# Patient Record
Sex: FEMALE | Race: BLACK OR AFRICAN AMERICAN
[De-identification: names, ages, dates, MRNs, and addresses within clinical notes are randomized per-mention and may not be internally consistent; named-entity substitution may affect disease eponyms.]

---

## 2017-11-14 PROBLEM — Z00.00 ENCOUNTER FOR PREVENTIVE HEALTH EXAMINATION: Status: ACTIVE | Noted: 2017-11-14

## 2021-04-06 ENCOUNTER — APPOINTMENT (OUTPATIENT)
Dept: OBGYN | Facility: CLINIC | Age: 56
End: 2021-04-06
Payer: MEDICAID

## 2021-04-06 VITALS
WEIGHT: 185 LBS | DIASTOLIC BLOOD PRESSURE: 94 MMHG | HEIGHT: 67 IN | BODY MASS INDEX: 29.03 KG/M2 | HEART RATE: 62 BPM | TEMPERATURE: 97.8 F | SYSTOLIC BLOOD PRESSURE: 139 MMHG

## 2021-04-06 DIAGNOSIS — Z63.5 DISRUPTION OF FAMILY BY SEPARATION AND DIVORCE: ICD-10-CM

## 2021-04-06 DIAGNOSIS — Z78.9 OTHER SPECIFIED HEALTH STATUS: ICD-10-CM

## 2021-04-06 DIAGNOSIS — Z85.831 PERSONAL HISTORY OF MALIGNANT NEOPLASM OF SOFT TISSUE: ICD-10-CM

## 2021-04-06 DIAGNOSIS — Z01.419 ENCOUNTER FOR GYNECOLOGICAL EXAMINATION (GENERAL) (ROUTINE) W/OUT ABNORMAL FINDINGS: ICD-10-CM

## 2021-04-06 DIAGNOSIS — Z83.3 FAMILY HISTORY OF DIABETES MELLITUS: ICD-10-CM

## 2021-04-06 DIAGNOSIS — Z82.49 FAMILY HISTORY OF ISCHEMIC HEART DISEASE AND OTHER DISEASES OF THE CIRCULATORY SYSTEM: ICD-10-CM

## 2021-04-06 DIAGNOSIS — Z80.3 FAMILY HISTORY OF MALIGNANT NEOPLASM OF BREAST: ICD-10-CM

## 2021-04-06 LAB
BILIRUB UR QL STRIP: NORMAL
CLARITY UR: CLEAR
COLLECTION METHOD: NORMAL
GLUCOSE UR-MCNC: NORMAL
HCG UR QL: 0.2 EU/DL
HGB UR QL STRIP.AUTO: NORMAL
KETONES UR-MCNC: ABNORMAL
LEUKOCYTE ESTERASE UR QL STRIP: NORMAL
NITRITE UR QL STRIP: NORMAL
PH UR STRIP: 5.5
PROT UR STRIP-MCNC: NORMAL
SP GR UR STRIP: 1.02

## 2021-04-06 PROCEDURE — 99072 ADDL SUPL MATRL&STAF TM PHE: CPT

## 2021-04-06 PROCEDURE — 99396 PREV VISIT EST AGE 40-64: CPT

## 2021-04-06 PROCEDURE — 81003 URINALYSIS AUTO W/O SCOPE: CPT | Mod: QW

## 2021-04-06 SDOH — SOCIAL STABILITY - SOCIAL INSECURITY: DISRUPTION OF FAMILY BY SEPARATION AND DIVORCE: Z63.5

## 2021-04-09 LAB
C TRACH RRNA SPEC QL NAA+PROBE: NOT DETECTED
HPV HIGH+LOW RISK DNA PNL CVX: NOT DETECTED
N GONORRHOEA RRNA SPEC QL NAA+PROBE: NOT DETECTED
SOURCE AMPLIFICATION: NORMAL
SOURCE AMPLIFICATION: NORMAL
T VAGINALIS RRNA SPEC QL NAA+PROBE: NOT DETECTED

## 2021-04-15 LAB — CYTOLOGY CVX/VAG DOC THIN PREP: ABNORMAL

## 2021-04-19 RX ORDER — METRONIDAZOLE 7.5 MG/G
0.75 GEL VAGINAL
Qty: 1 | Refills: 0 | Status: ACTIVE | COMMUNITY
Start: 2021-04-19 | End: 1900-01-01

## 2021-04-20 ENCOUNTER — NON-APPOINTMENT (OUTPATIENT)
Age: 56
End: 2021-04-20

## 2021-05-18 PROBLEM — Z85.831: Status: RESOLVED | Noted: 2021-05-18 | Resolved: 2021-05-18

## 2021-05-18 PROBLEM — Z78.9 EXERCISES OCCASIONALLY: Status: ACTIVE | Noted: 2021-05-18

## 2021-05-18 PROBLEM — Z01.419 ENCOUNTER FOR ANNUAL ROUTINE GYNECOLOGICAL EXAMINATION: Status: RESOLVED | Noted: 2021-05-18 | Resolved: 2021-06-01

## 2021-05-18 PROBLEM — Z63.5 DIVORCED: Status: ACTIVE | Noted: 2021-05-18

## 2021-05-18 PROBLEM — Z83.3 FAMILY HISTORY OF DIABETES MELLITUS: Status: ACTIVE | Noted: 2021-05-18

## 2021-05-18 PROBLEM — Z80.3 FAMILY HISTORY OF MALIGNANT NEOPLASM OF BREAST: Status: ACTIVE | Noted: 2021-05-18

## 2021-05-18 PROBLEM — Z82.49 FAMILY HISTORY OF HYPERTENSION: Status: ACTIVE | Noted: 2021-05-18

## 2021-05-18 PROBLEM — Z78.9 CONSUMES ALCOHOL OCCASIONALLY: Status: ACTIVE | Noted: 2021-05-18

## 2021-05-18 PROBLEM — Z78.9 DOES NOT USE TOBACCO: Status: ACTIVE | Noted: 2021-05-18

## 2021-05-18 PROBLEM — Z78.9 DOES NOT USE ILLICIT DRUGS: Status: ACTIVE | Noted: 2021-05-18

## 2021-05-18 NOTE — DISCUSSION/SUMMARY
[FreeTextEntry1] : A: 57yo for annual GYN exam, menopause, elevated BP, BMI 28\par \par P: GYN Exam done\par     pap smear done\par      safe sex practices if active\par      pain and bleeding precautions\par      referral for mammogram\par      encourage to keep GI appt for follow up\par      monitor BP and f/u with PCP for HTN mgmt\par      encourage healthy diet and exercise\par      f/u 1 yr or PRN

## 2021-05-18 NOTE — HISTORY OF PRESENT ILLNESS
[Patient reported mammogram was normal] : Patient reported mammogram was normal [Patient reported PAP Smear was normal] : Patient reported PAP Smear was normal [LMP unknown] : LMP unknown [postmenopausal] : postmenopausal [N] : Patient is not sexually active [Y] : Positive pregnancy history [Menarche Age: ____] : age at menarche was [unfilled] [Currently In Menopause] : currently in menopause [Menopause Age: ____] : age at menopause was [unfilled] [Previously active] : previously active [Gonorrhea test offered] : Gonorrhea test offered [Chlamydia test offered] : Chlamydia test offered [Trichomonas test offered] : Trichomonas test offered [HPV test offered] : HPV test offered [Men] : men [No] : No [FreeTextEntry1] : Pt is here for her annual exam. overall pt feels good, denies any pelvic pain or abnormal bleeding.  [TextBox_4] : 55yo  in menopause came for annual GYN exam and pap smear.  She denies PMB, pelvic pain, discharge, dysuria or other GYN complaints.  She denies h/o abnl pap/HPV, STDs, fibroids of cysts.  She is not currently active.\par \par Patient has FHx of breast cancer in sister and cousins at an early age.  Patient counseled and offered hereditary cancer gene testing and declined today but states will think about it [Mammogramdate] : 2020 [TextBox_19] : will give referral [PapSmeardate] : 2019 [ColonoscopyDate] : 2019 [TextBox_43] : 2 year follow up, has appt this week [PGxTotal] : 6 [[facility]] : 3 [PGHxPremature] : 0 [PGHxAbortions] : 3 [[facility]] : 3 [PGHxABInduced] : 2 [PGHxABSpont] : 1 [PGHxEctopic] : 0 [PGHxMultBirths] : 0

## 2021-05-18 NOTE — PHYSICAL EXAM
[Appropriately responsive] : appropriately responsive [Alert] : alert [No Acute Distress] : no acute distress [No Lymphadenopathy] : no lymphadenopathy [Regular Rate Rhythm] : regular rate rhythm [Soft] : soft [Non-tender] : non-tender [Non-distended] : non-distended [No Mass] : no mass [Oriented x3] : oriented x3 [Examination Of The Breasts] : a normal appearance [Normal] : normal [No Discharge] : no discharge [No Masses] : no breast masses were palpable [Vulvar Atrophy] : vulvar atrophy [No Lesions] : no lesions  [Labia Majora] : normal [Labia Minora] : normal [Atrophy] : atrophy [No Bleeding] : There was no active vaginal bleeding [Tenderness] : nontender [Enlarged ___ wks] : not enlarged [Mass ___ cm] : no uterine mass was palpated [Uterine Adnexae] : normal [FreeTextEntry5] : pap smear done [FreeTextEntry6] : limited due to habitus

## 2022-06-07 ENCOUNTER — APPOINTMENT (OUTPATIENT)
Dept: OBGYN | Facility: CLINIC | Age: 57
End: 2022-06-07

## 2022-06-07 ENCOUNTER — LABORATORY RESULT (OUTPATIENT)
Age: 57
End: 2022-06-07

## 2022-06-07 VITALS
SYSTOLIC BLOOD PRESSURE: 136 MMHG | DIASTOLIC BLOOD PRESSURE: 109 MMHG | WEIGHT: 191 LBS | HEART RATE: 94 BPM | HEIGHT: 67 IN | BODY MASS INDEX: 29.98 KG/M2 | TEMPERATURE: 98.4 F

## 2022-06-07 DIAGNOSIS — L29.2 PRURITUS VULVAE: ICD-10-CM

## 2022-06-07 DIAGNOSIS — B96.89 ACUTE VAGINITIS: ICD-10-CM

## 2022-06-07 DIAGNOSIS — N76.0 ACUTE VAGINITIS: ICD-10-CM

## 2022-06-07 DIAGNOSIS — I10 ESSENTIAL (PRIMARY) HYPERTENSION: ICD-10-CM

## 2022-06-07 PROCEDURE — 99396 PREV VISIT EST AGE 40-64: CPT

## 2022-06-07 PROCEDURE — 81003 URINALYSIS AUTO W/O SCOPE: CPT | Mod: QW

## 2022-06-08 LAB
BILIRUB UR QL STRIP: NORMAL
CLARITY UR: CLEAR
COLLECTION METHOD: NORMAL
GLUCOSE UR-MCNC: NORMAL
HCG UR QL: 0.2 EU/DL
HGB UR QL STRIP.AUTO: NORMAL
KETONES UR-MCNC: NORMAL
LEUKOCYTE ESTERASE UR QL STRIP: NORMAL
NITRITE UR QL STRIP: NORMAL
PH UR STRIP: 5.5
PROT UR STRIP-MCNC: NORMAL
SP GR UR STRIP: 1.02

## 2022-06-14 ENCOUNTER — EMERGENCY (EMERGENCY)
Facility: HOSPITAL | Age: 57
LOS: 0 days | Discharge: HOME | End: 2022-06-14
Attending: EMERGENCY MEDICINE | Admitting: EMERGENCY MEDICINE
Payer: MEDICAID

## 2022-06-14 VITALS
WEIGHT: 195.11 LBS | SYSTOLIC BLOOD PRESSURE: 142 MMHG | RESPIRATION RATE: 20 BRPM | TEMPERATURE: 99 F | DIASTOLIC BLOOD PRESSURE: 88 MMHG | OXYGEN SATURATION: 99 % | HEART RATE: 62 BPM | HEIGHT: 67 IN

## 2022-06-14 DIAGNOSIS — Z98.84 BARIATRIC SURGERY STATUS: Chronic | ICD-10-CM

## 2022-06-14 DIAGNOSIS — I10 ESSENTIAL (PRIMARY) HYPERTENSION: ICD-10-CM

## 2022-06-14 DIAGNOSIS — J45.909 UNSPECIFIED ASTHMA, UNCOMPLICATED: ICD-10-CM

## 2022-06-14 DIAGNOSIS — M79.89 OTHER SPECIFIED SOFT TISSUE DISORDERS: ICD-10-CM

## 2022-06-14 DIAGNOSIS — R06.02 SHORTNESS OF BREATH: ICD-10-CM

## 2022-06-14 DIAGNOSIS — Z98.89 OTHER SPECIFIED POSTPROCEDURAL STATES: Chronic | ICD-10-CM

## 2022-06-14 LAB
ALBUMIN SERPL ELPH-MCNC: 4.5 G/DL — SIGNIFICANT CHANGE UP (ref 3.5–5.2)
ALP SERPL-CCNC: 95 U/L — SIGNIFICANT CHANGE UP (ref 30–115)
ALT FLD-CCNC: 16 U/L — SIGNIFICANT CHANGE UP (ref 0–41)
ANION GAP SERPL CALC-SCNC: 11 MMOL/L — SIGNIFICANT CHANGE UP (ref 7–14)
AST SERPL-CCNC: 29 U/L — SIGNIFICANT CHANGE UP (ref 0–41)
BASOPHILS # BLD AUTO: 0.03 K/UL — SIGNIFICANT CHANGE UP (ref 0–0.2)
BASOPHILS NFR BLD AUTO: 0.7 % — SIGNIFICANT CHANGE UP (ref 0–1)
BILIRUB SERPL-MCNC: 0.4 MG/DL — SIGNIFICANT CHANGE UP (ref 0.2–1.2)
BUN SERPL-MCNC: 16 MG/DL — SIGNIFICANT CHANGE UP (ref 10–20)
CALCIUM SERPL-MCNC: 9.2 MG/DL — SIGNIFICANT CHANGE UP (ref 8.5–10.1)
CHLORIDE SERPL-SCNC: 102 MMOL/L — SIGNIFICANT CHANGE UP (ref 98–110)
CO2 SERPL-SCNC: 27 MMOL/L — SIGNIFICANT CHANGE UP (ref 17–32)
CREAT SERPL-MCNC: 1 MG/DL — SIGNIFICANT CHANGE UP (ref 0.7–1.5)
D DIMER BLD IA.RAPID-MCNC: 152 NG/ML DDU — SIGNIFICANT CHANGE UP (ref 0–230)
EGFR: 66 ML/MIN/1.73M2 — SIGNIFICANT CHANGE UP
EOSINOPHIL # BLD AUTO: 0.15 K/UL — SIGNIFICANT CHANGE UP (ref 0–0.7)
EOSINOPHIL NFR BLD AUTO: 3.7 % — SIGNIFICANT CHANGE UP (ref 0–8)
GLUCOSE SERPL-MCNC: 116 MG/DL — HIGH (ref 70–99)
HCT VFR BLD CALC: 35.3 % — LOW (ref 37–47)
HGB BLD-MCNC: 11.4 G/DL — LOW (ref 12–16)
IMM GRANULOCYTES NFR BLD AUTO: 0.2 % — SIGNIFICANT CHANGE UP (ref 0.1–0.3)
LYMPHOCYTES # BLD AUTO: 1.05 K/UL — LOW (ref 1.2–3.4)
LYMPHOCYTES # BLD AUTO: 25.9 % — SIGNIFICANT CHANGE UP (ref 20.5–51.1)
MAGNESIUM SERPL-MCNC: 2.5 MG/DL — HIGH (ref 1.8–2.4)
MCHC RBC-ENTMCNC: 28.4 PG — SIGNIFICANT CHANGE UP (ref 27–31)
MCHC RBC-ENTMCNC: 32.3 G/DL — SIGNIFICANT CHANGE UP (ref 32–37)
MCV RBC AUTO: 88 FL — SIGNIFICANT CHANGE UP (ref 81–99)
MONOCYTES # BLD AUTO: 0.43 K/UL — SIGNIFICANT CHANGE UP (ref 0.1–0.6)
MONOCYTES NFR BLD AUTO: 10.6 % — HIGH (ref 1.7–9.3)
NEUTROPHILS # BLD AUTO: 2.38 K/UL — SIGNIFICANT CHANGE UP (ref 1.4–6.5)
NEUTROPHILS NFR BLD AUTO: 58.9 % — SIGNIFICANT CHANGE UP (ref 42.2–75.2)
NRBC # BLD: 0 /100 WBCS — SIGNIFICANT CHANGE UP (ref 0–0)
NT-PROBNP SERPL-SCNC: 184 PG/ML — SIGNIFICANT CHANGE UP (ref 0–300)
PLATELET # BLD AUTO: 294 K/UL — SIGNIFICANT CHANGE UP (ref 130–400)
POTASSIUM SERPL-MCNC: 3.9 MMOL/L — SIGNIFICANT CHANGE UP (ref 3.5–5)
POTASSIUM SERPL-SCNC: 3.9 MMOL/L — SIGNIFICANT CHANGE UP (ref 3.5–5)
PROT SERPL-MCNC: 7.2 G/DL — SIGNIFICANT CHANGE UP (ref 6–8)
RBC # BLD: 4.01 M/UL — LOW (ref 4.2–5.4)
RBC # FLD: 14.6 % — HIGH (ref 11.5–14.5)
SODIUM SERPL-SCNC: 140 MMOL/L — SIGNIFICANT CHANGE UP (ref 135–146)
TROPONIN T SERPL-MCNC: <0.01 NG/ML — SIGNIFICANT CHANGE UP
WBC # BLD: 4.05 K/UL — LOW (ref 4.8–10.8)
WBC # FLD AUTO: 4.05 K/UL — LOW (ref 4.8–10.8)

## 2022-06-14 PROCEDURE — 93010 ELECTROCARDIOGRAM REPORT: CPT

## 2022-06-14 PROCEDURE — 71045 X-RAY EXAM CHEST 1 VIEW: CPT | Mod: 26

## 2022-06-14 PROCEDURE — 99285 EMERGENCY DEPT VISIT HI MDM: CPT

## 2022-06-14 NOTE — ED PROVIDER NOTE - NSICDXPASTSURGICALHX_GEN_ALL_CORE_FT
PAST SURGICAL HISTORY:  History of gastric bypass 2011    History of recent maxillofacial surgery 2006 with upper oral prosthesis

## 2022-06-14 NOTE — ED PROVIDER NOTE - NS ED ATTENDING STATEMENT MOD
This was a shared visit with the PRABHJOT. I reviewed and verified the documentation and independently performed the documented:

## 2022-06-14 NOTE — ED PROVIDER NOTE - CHIEF COMPLAINT
Patient Information     Patient Name MRN Pennie Car [id number] Female 1951      Telephone Encounter by Jocelin Coe at 2017  9:20 AM     Author:  Jocelin Coe Service:  (none) Author Type:  (none)     Filed:  2017  9:21 AM Encounter Date:  2017 Status:  Signed     :  Jocelin Coe            Spoke with [employer] and let them know patient has future appointments to help diagnose cause of patient's incontinence and we will send back form when we have diagnosis to attach to paperwork.  Jocelin Coe LPN...................2017  9:21 AM         Refill approved for 1 month.  Alexa Damon needs to be seen for an appointment before further refills are given.     The patient is a 57y Female complaining of shortness of breath.

## 2022-06-14 NOTE — ED PROVIDER NOTE - CLINICAL SUMMARY MEDICAL DECISION MAKING FREE TEXT BOX
57-year-old female history of recent ?  coil presenting for evaluation of intermittent shortness of breath with associated bilateral lower extremity swelling.  States she had another episode today, worse than prior.  No chest pain.  No other acute complaints.  Well appearing, NAD, non toxic. NCAT PERRLA EOMI neck supple non tender normal wob cta bl rrr abdomen s nt nd no rebound no guarding WWPx4 neuro non focal mild bilateral lower extremity ankle edema, nontender.  Labs EKG imaging reviewed.  Patient with reliable outpatient follow-up, states that she has an appointment with cardiologist to have a Holter monitor placed.  Aware of all results, given a copy of all available results, comfortable with discharge and follow-up outpatient, strict return precautions given. Endorses understanding and aware they can return at any time for further evaluation. No further questions or concerns at this time.

## 2022-06-14 NOTE — ED PROVIDER NOTE - PATIENT PORTAL LINK FT
You can access the FollowMyHealth Patient Portal offered by [facility] by registering at the following website: [email or web address]. By joining [employer]’s FollowMyHealth portal, you will also be able to view your health information using other applications (apps) compatible with our system.

## 2022-06-14 NOTE — ED PROVIDER NOTE - OBJECTIVE STATEMENT
56 yo female, pmh of htn, asthma, recently (3 weeks ago) had procedure done for ? brain aneurysm s/o right femoral MYNXGRIP vascular closing device. Pt reports no issues since closure device placed; states today was driving, felt sob without any specific pain or radiation. Denies fever, chills, cp, abd pain, nvd, syncope, cough, dizziness.

## 2022-06-14 NOTE — ED PROVIDER NOTE - PHYSICAL EXAMINATION
Physical Exam    Vital Signs: I have reviewed the initial vital signs.  Constitutional: appears stated age, no acute distress  Eyes: Conjunctiva pink, Sclera clear  Cardiovascular: S1 and S2, regular rate, regular rhythm, well-perfused extremities, radial pulses equal and 2+, pedal pulses 2+ and equal  Respiratory: unlabored respiratory effort, clear to auscultation bilaterally no wheezing, rales and rhonchi  Gastrointestinal: soft, non-tender abdomen, no pulsatile mass, normal bowl sounds  Musculoskeletal: supple neck, no lower extremity edema, no midline tenderness  Integumentary: warm, dry, no rash  Neurologic: awake, alert, nvi

## 2022-06-14 NOTE — ED PROVIDER NOTE - IV ALTEPLASE EXCL ABS HIDDEN
Discharge instructions given and reviewed with pt. Questions answered at this time. Pt verbalized understanding. Vss. Will call when ready to leave.   
show

## 2022-06-15 LAB
C TRACH RRNA SPEC QL NAA+PROBE: NOT DETECTED
CYTOLOGY CVX/VAG DOC THIN PREP: NORMAL
HPV HIGH+LOW RISK DNA PNL CVX: DETECTED
N GONORRHOEA RRNA SPEC QL NAA+PROBE: NOT DETECTED
SOURCE AMPLIFICATION: NORMAL
SOURCE AMPLIFICATION: NORMAL
T VAGINALIS RRNA SPEC QL NAA+PROBE: NOT DETECTED

## 2022-06-22 ENCOUNTER — APPOINTMENT (OUTPATIENT)
Dept: OBGYN | Facility: CLINIC | Age: 57
End: 2022-06-22

## 2022-06-22 PROCEDURE — 99442: CPT

## 2022-07-31 PROBLEM — N76.0 BACTERIAL VAGINOSIS: Status: RESOLVED | Noted: 2021-04-19 | Resolved: 2022-07-31

## 2022-07-31 PROBLEM — L29.2 VULVAR PRURITUS: Status: ACTIVE | Noted: 2022-07-31

## 2022-07-31 PROBLEM — I10 HTN (HYPERTENSION): Status: ACTIVE | Noted: 2021-05-18

## 2022-07-31 NOTE — PHYSICAL EXAM
[Appropriately responsive] : appropriately responsive [Alert] : alert [No Acute Distress] : no acute distress [No Lymphadenopathy] : no lymphadenopathy [Regular Rate Rhythm] : regular rate rhythm [Soft] : soft [Non-tender] : non-tender [Non-distended] : non-distended [No Mass] : no mass [Examination Of The Breasts] : a normal appearance [No Discharge] : no discharge [No Masses] : no breast masses were palpable [Vulvar Atrophy] : vulvar atrophy [No Lesions] : no lesions  [Labia Majora] : normal [Labia Minora] : normal [Normal] : normal [Atrophy] : atrophy [No Bleeding] : There was no active vaginal bleeding [Tenderness] : nontender [Enlarged ___ wks] : not enlarged [Mass ___ cm] : no uterine mass was palpated [Uterine Adnexae] : normal [FreeTextEntry5] : Pap smear done [FreeTextEntry6] : Exam limited to habitus-no masses or tenderness palpated on bimanual exam

## 2022-07-31 NOTE — DISCUSSION/SUMMARY
[FreeTextEntry1] : A: 57-year-old for annual GYN exam, menopause, vulvar pruritus, ovarian cysts, BMI 29\par \par P: GYN exam done\par Pap smear done\par Safe sex practices if active\par Pain and bleeding precautions\par Clotrimazole/betamethasone Rx given for as needed use\par Referral for pelvic ultrasound given\par Referral for mammogram given\par Encourage healthy diet and exercise\par Follow-up after imaging or as needed

## 2022-07-31 NOTE — HISTORY OF PRESENT ILLNESS
[Patient reported mammogram was normal] : Patient reported mammogram was normal [LMP unknown] : LMP unknown [Patient reported colonoscopy was normal] : Patient reported colonoscopy was normal [postmenopausal] : postmenopausal [N] : Patient is not sexually active [Y] : Positive pregnancy history [unknown] : Patient is unsure of the date of her LMP [Menarche Age: ____] : age at menarche was [unfilled] [Currently In Menopause] : currently in menopause [Menopause Age: ____] : age at menopause was [unfilled] [Previously active] : previously active [Patient reported PAP Smear was normal] : Patient reported PAP Smear was normal [Gonorrhea test offered] : Gonorrhea test offered [Chlamydia test offered] : Chlamydia test offered [Trichomonas test offered] : Trichomonas test offered [HPV test offered] : HPV test offered [Men] : men [No] : No [TextBox_4] : 57-year-old para 3-0-3-3 in menopause came for annual GYN exam and Pap smear.  Patient denies postmenopausal bleeding, pelvic pain, discharge or dysuria.  Patient does state that she had a CT of abdomen and pelvis done September 2021 and was told to follow-up with her GYN and only has followed up now.  Patient brought report that shows mildly heterogenous uterus.  Left ovarian simple cyst measuring 2.1 cm.  Normal right ovary and normal cervix.  There is no lymphadenopathy noted and there was trace fluid in the cul-de-sac.  There are other small findings that are not GYN related that she followed up with her doctor.  Recommendation was for 6-month follow-up pelvic ultrasound to CT to evaluate stability or resolution of cyst.  Patient also complains of intermittent vulvar pruritus.  Patient denies history of abnormal Pap, HPV, STDs or fibroids.  She is not currently sexually active.\par \par U dip: Negative [Mammogramdate] : 2021 [TextBox_19] : Will give referral [PapSmeardate] : 2021 [ColonoscopyDate] : 2022 [TextBox_43] : f/u 2 yrs  [PGxTotal] : 6 [[facility] Center[facility]Term] : 3 [PGHxAbortions] : 3 [[facility] Centeriving] : 3 [PGHxABInduced] : 2 [PGHxABSpont] : 1 [FreeTextEntry1] :

## 2022-08-17 ENCOUNTER — APPOINTMENT (OUTPATIENT)
Dept: OBGYN | Facility: CLINIC | Age: 57
End: 2022-08-17

## 2022-08-17 PROCEDURE — 99442: CPT

## 2022-09-20 ENCOUNTER — APPOINTMENT (OUTPATIENT)
Dept: OBGYN | Facility: CLINIC | Age: 57
End: 2022-09-20

## 2022-09-20 VITALS
TEMPERATURE: 98 F | HEIGHT: 67 IN | BODY MASS INDEX: 29.82 KG/M2 | SYSTOLIC BLOOD PRESSURE: 102 MMHG | HEART RATE: 63 BPM | WEIGHT: 190 LBS | DIASTOLIC BLOOD PRESSURE: 69 MMHG

## 2022-09-20 DIAGNOSIS — Z11.3 ENCOUNTER FOR SCREENING FOR INFECTIONS WITH A PREDOMINANTLY SEXUAL MODE OF TRANSMISSION: ICD-10-CM

## 2022-09-20 DIAGNOSIS — N89.8 OTHER SPECIFIED NONINFLAMMATORY DISORDERS OF VAGINA: ICD-10-CM

## 2022-09-20 PROCEDURE — 99213 OFFICE O/P EST LOW 20 MIN: CPT

## 2022-09-23 ENCOUNTER — NON-APPOINTMENT (OUTPATIENT)
Age: 57
End: 2022-09-23

## 2022-09-23 LAB
A VAGINAE DNA VAG QL NAA+PROBE: ABNORMAL
BVAB2 DNA VAG QL NAA+PROBE: ABNORMAL
C KRUSEI DNA VAG QL NAA+PROBE: NEGATIVE
C TRACH RRNA SPEC QL NAA+PROBE: NEGATIVE
MEGA1 DNA VAG QL NAA+PROBE: ABNORMAL
N GONORRHOEA RRNA SPEC QL NAA+PROBE: NEGATIVE
T VAGINALIS RRNA SPEC QL NAA+PROBE: NEGATIVE

## 2022-09-23 RX ORDER — METRONIDAZOLE 7.5 MG/G
0.75 GEL VAGINAL
Qty: 1 | Refills: 1 | Status: ACTIVE | COMMUNITY
Start: 2022-09-23 | End: 1900-01-01

## 2022-10-12 PROBLEM — N89.8 VAGINAL IRRITATION: Status: ACTIVE | Noted: 2022-10-12

## 2022-10-12 NOTE — DISCUSSION/SUMMARY
[FreeTextEntry1] : A: 57-year-old for GYN exam, vaginal irritation, vulvovaginal atrophy, ovarian cyst, BMI 29\par \par P: GYN exam done\par Genital cultures done\par Safe sex practices\par Pain and bleeding precautions\par Patient encouraged to get previously recommended pelvic MRI to evaluate complex cyst\par Encourage healthy diet and exercise\par Follow-up after imaging or as needed

## 2022-10-12 NOTE — HISTORY OF PRESENT ILLNESS
[unknown] : Patient is unsure of the date of her LMP [Menarche Age: ____] : age at menarche was [unfilled] [Currently In Menopause] : currently in menopause [Menopause Age: ____] : age at menopause was [unfilled] [Currently Active] : currently active [Men] : men [No] : No [Gonorrhea test offered] : Gonorrhea test offered [Chlamydia test offered] : Chlamydia test offered [Trichomonas test offered] : Trichomonas test offered [postmenopausal] : postmenopausal [N] : Patient does not use contraception [Y] : Positive pregnancy history [Yes] : Yes [FreeTextEntry1] : 58y/o  menopause at age 50, Pt is here for her follow-up exam due to vaginal discomfort.  [TextBox_4] : 57-year-old para 3-0-3-3 in menopause came for GYN evaluation due to having unprotected intercourse and feeling vaginal discomfort and irritation.  She also had noticed some odor and discharge but states the symptoms have improved.  She would like genital cultures done but declines serum STDs.  This is a new partner.  She also previously was found to have an adnexal complex cyst by ultrasound that was followed from her previous CT and an MRI was recommended to look for features of the cysts.  Patient has not went for MRI as of yet and was encouraged to do so as soon as possible.  She denies any pelvic or adnexal pain at this time.  She denies any postmenopausal bleeding or dysuria. [PGxTotal] : 6 [[facility] CenterlTerm] : 3 [PGHxPremature] : 0 [PGHxAbortions] : 3 [[facility] Hospitaliving] : 3

## 2022-10-12 NOTE — PHYSICAL EXAM
[Appropriately responsive] : appropriately responsive [Alert] : alert [No Acute Distress] : no acute distress [Regular Rate Rhythm] : regular rate rhythm [Soft] : soft [Non-tender] : non-tender [Non-distended] : non-distended [No Mass] : no mass [Oriented x3] : oriented x3 [Vulvar Atrophy] : vulvar atrophy [No Lesions] : no lesions  [Labia Majora] : normal [Labia Minora] : normal [No Bleeding] : There was no active vaginal bleeding [Normal] : normal [Tenderness] : nontender [Enlarged ___ wks] : not enlarged [Mass ___ cm] : no uterine mass was palpated [Uterine Adnexae] : normal [FreeTextEntry4] : Scant discharge noted-genital cultures done [FreeTextEntry6] : Exam limited to habitus

## 2022-10-26 ENCOUNTER — APPOINTMENT (OUTPATIENT)
Dept: OBGYN | Facility: CLINIC | Age: 57
End: 2022-10-26

## 2022-10-26 PROCEDURE — 99442: CPT

## 2022-11-07 ENCOUNTER — RX RENEWAL (OUTPATIENT)
Age: 57
End: 2022-11-07

## 2022-12-20 ENCOUNTER — APPOINTMENT (OUTPATIENT)
Dept: OBGYN | Facility: CLINIC | Age: 57
End: 2022-12-20
Payer: MEDICAID

## 2022-12-20 VITALS
WEIGHT: 190 LBS | TEMPERATURE: 98 F | BODY MASS INDEX: 29.82 KG/M2 | HEIGHT: 67 IN | SYSTOLIC BLOOD PRESSURE: 127 MMHG | DIASTOLIC BLOOD PRESSURE: 95 MMHG | HEART RATE: 64 BPM

## 2022-12-20 PROCEDURE — 99213 OFFICE O/P EST LOW 20 MIN: CPT

## 2022-12-21 ENCOUNTER — LABORATORY RESULT (OUTPATIENT)
Age: 57
End: 2022-12-21

## 2022-12-22 LAB — CANCER AG125 SERPL-ACNC: 11 U/ML

## 2022-12-23 LAB
CYTOLOGY CVX/VAG DOC THIN PREP: ABNORMAL
HE4X: 27.2 PMOL/L

## 2023-01-02 LAB — HPV HIGH+LOW RISK DNA PNL CVX: DETECTED

## 2023-01-06 RX ORDER — METRONIDAZOLE 7.5 MG/G
0.75 GEL VAGINAL
Qty: 1 | Refills: 0 | Status: ACTIVE | COMMUNITY
Start: 2022-10-21 | End: 1900-01-01

## 2023-02-20 NOTE — HISTORY OF PRESENT ILLNESS
[unknown] : Patient is unsure of the date of her LMP [Menarche Age: ____] : age at menarche was [unfilled] [Currently In Menopause] : currently in menopause [Menopause Age: ____] : age at menopause was [unfilled] [Currently Active] : currently active [Men] : men [No] : No [Patient reported PAP Smear was abnormal] : Patient reported PAP Smear was abnormal [HPV test offered] : HPV test offered [postmenopausal] : postmenopausal [Y] : Positive pregnancy history [FreeTextEntry1] : 56y/o  menopause at age 50, Pt is here for a 6 month repeat pap smear due Hx of abnormal pap and HPV detected, pt would also like the results of her transabdominal /vaginal sonogram. Pt has no complaints today.  [TextBox_4] : 57-year-old in menopause came for repeat Pap smear and follow-up results for pelvic ultrasound.  Patient was previously found to have high risk HPV positive with 16/18/45 negative.  The cells 6 months ago were negative.  Patient due for repeat.  She denies previous history of abnormal Pap.  She also has a finding of an ovarian cyst in which a pelvic MRI was done and a repeat pelvic ultrasound in 3 months was recommended for evaluation of stability of hemorrhagic cyst that was noted.  Patient denies any postmenopausal bleeding or pelvic pain.\par \par Patient was counseled that recent ultrasound showed uterus 6.8 x 2.8 x 4.6 cm with a 1 x 1 x 1.5 cm intramural fundal fibroid.  The endometrial stripe states 6 mm however it was 2 mm on recent MRI and the patient is asymptomatic.  Her right ovary measures 2.1 x 1.5 x 1.5 cm with no adnexal lesions.  The ovary measures 2.7 x 2.0 x 3.2 cm with a 2.3 x 1.5 x 2.1 cm cyst which is stable from the cyst size noted on recent MRI.  There was no free fluid.  Patient counseled on note of stability and possible discrepancy of endometrial stripe from the MRI versus an ultrasound.  As she is asymptomatic and denies any postmenopausal bleeding or pelvic pain we discussed repeat ultrasound in 6 months.  However if she does become symptomatic or has further concerns she supposed to follow-up sooner.  Patient understood all counseling and all questions answered satisfactorily and she is in agreement with management planning.\par \par Patient states since her last visit she was diagnosed with a brain hemangioma and has started radiation therapy.  She denies any significant adverse effects. [PapSmeardate] : 6/2022 [TextBox_31] : High risk HPV positive, cells negative [PGHxTotal] : 4

## 2023-02-20 NOTE — DISCUSSION/SUMMARY
[FreeTextEntry1] : A: 57-year-old with high risk HPV, ovarian cyst, menopause, BV, BMI 29\par \par P: GYN exam done\par Pap smear done\par Metro[employer]el Rx sent\par Safe sex practices\par Pain and bleeding precautions\par Test results reviewed and discussed\par Referral for repeat pelvic ultrasound given\par Encourage healthy diet and exercise\par Follow-up 6 months or as needed

## 2023-02-20 NOTE — PHYSICAL EXAM
[Appropriately responsive] : appropriately responsive [Alert] : alert [No Acute Distress] : no acute distress [Regular Rate Rhythm] : regular rate rhythm [Soft] : soft [Non-tender] : non-tender [Non-distended] : non-distended [No Mass] : no mass [Oriented x3] : oriented x3 [Vulvar Atrophy] : vulvar atrophy [No Lesions] : no lesions  [Labia Majora] : normal [Labia Minora] : normal [Normal] : normal [Atrophy] : atrophy [No Bleeding] : There was no active vaginal bleeding [Tenderness] : nontender [Enlarged ___ wks] : not enlarged [Mass ___ cm] : no uterine mass was palpated [Uterine Adnexae] : normal [FreeTextEntry4] : Vaginal discharge with mild odor noted [FreeTextEntry5] : Pap smear done [FreeTextEntry6] : Exam limited to habitus

## 2024-02-06 ENCOUNTER — APPOINTMENT (OUTPATIENT)
Dept: OBGYN | Facility: CLINIC | Age: 59
End: 2024-02-06

## 2024-03-18 ENCOUNTER — NON-APPOINTMENT (OUTPATIENT)
Age: 59
End: 2024-03-18

## 2024-04-02 ENCOUNTER — NON-APPOINTMENT (OUTPATIENT)
Age: 59
End: 2024-04-02

## 2024-04-08 ENCOUNTER — NON-APPOINTMENT (OUTPATIENT)
Age: 59
End: 2024-04-08

## 2024-04-19 ENCOUNTER — NON-APPOINTMENT (OUTPATIENT)
Age: 59
End: 2024-04-19

## 2024-05-03 ENCOUNTER — APPOINTMENT (OUTPATIENT)
Dept: OBGYN | Facility: CLINIC | Age: 59
End: 2024-05-03
Payer: MEDICAID

## 2024-05-03 VITALS
HEART RATE: 71 BPM | SYSTOLIC BLOOD PRESSURE: 123 MMHG | HEIGHT: 67 IN | DIASTOLIC BLOOD PRESSURE: 84 MMHG | BODY MASS INDEX: 27.47 KG/M2 | WEIGHT: 175 LBS | TEMPERATURE: 98.6 F

## 2024-05-03 DIAGNOSIS — B96.89 ACUTE VAGINITIS: ICD-10-CM

## 2024-05-03 DIAGNOSIS — B97.7 PAPILLOMAVIRUS AS THE CAUSE OF DISEASES CLASSIFIED ELSEWHERE: ICD-10-CM

## 2024-05-03 DIAGNOSIS — R10.2 PELVIC AND PERINEAL PAIN: ICD-10-CM

## 2024-05-03 DIAGNOSIS — N76.0 ACUTE VAGINITIS: ICD-10-CM

## 2024-05-03 DIAGNOSIS — E66.3 OVERWEIGHT: ICD-10-CM

## 2024-05-03 DIAGNOSIS — N83.209 UNSPECIFIED OVARIAN CYST, UNSPECIFIED SIDE: ICD-10-CM

## 2024-05-03 DIAGNOSIS — Z92.3 PERSONAL HISTORY OF IRRADIATION: ICD-10-CM

## 2024-05-03 DIAGNOSIS — N95.2 POSTMENOPAUSAL ATROPHIC VAGINITIS: ICD-10-CM

## 2024-05-03 DIAGNOSIS — Z71.89 OTHER SPECIFIED COUNSELING: ICD-10-CM

## 2024-05-03 DIAGNOSIS — Z12.4 ENCOUNTER FOR SCREENING FOR MALIGNANT NEOPLASM OF CERVIX: ICD-10-CM

## 2024-05-03 DIAGNOSIS — Z71.2 PERSON CONSULTING FOR EXPLANATION OF EXAMINATION OR TEST FINDINGS: ICD-10-CM

## 2024-05-03 DIAGNOSIS — Z01.419 ENCOUNTER FOR GYNECOLOGICAL EXAMINATION (GENERAL) (ROUTINE) W/OUT ABNORMAL FINDINGS: ICD-10-CM

## 2024-05-03 DIAGNOSIS — D21.9 BENIGN NEOPLASM OF CONNECTIVE AND OTHER SOFT TISSUE, UNSPECIFIED: ICD-10-CM

## 2024-05-03 PROCEDURE — 99396 PREV VISIT EST AGE 40-64: CPT

## 2024-05-03 PROCEDURE — 99213 OFFICE O/P EST LOW 20 MIN: CPT | Mod: 25

## 2024-05-03 RX ORDER — METRONIDAZOLE 7.5 MG/G
0.75 GEL VAGINAL
Qty: 1 | Refills: 1 | Status: ACTIVE | COMMUNITY
Start: 2024-05-03 | End: 1900-01-01

## 2024-05-07 LAB
C TRACH RRNA SPEC QL NAA+PROBE: NOT DETECTED
CYTOLOGY CVX/VAG DOC THIN PREP: NORMAL
HPV HIGH+LOW RISK DNA PNL CVX: NOT DETECTED
N GONORRHOEA RRNA SPEC QL NAA+PROBE: NOT DETECTED
SOURCE AMPLIFICATION: NORMAL
SOURCE AMPLIFICATION: NORMAL
T VAGINALIS RRNA SPEC QL NAA+PROBE: NOT DETECTED

## 2024-06-16 PROBLEM — Z71.89 OTHER SPECIFIED COUNSELING: Status: ACTIVE | Noted: 2021-05-18

## 2024-06-16 PROBLEM — Z71.2 ENCOUNTER TO DISCUSS TEST RESULTS: Status: ACTIVE | Noted: 2023-02-20

## 2024-06-16 PROBLEM — E66.3 OVERWEIGHT: Status: ACTIVE | Noted: 2021-05-18

## 2024-06-16 PROBLEM — B97.7 HIGH RISK HPV INFECTION: Status: ACTIVE | Noted: 2022-06-22

## 2024-06-16 PROBLEM — N95.2 ATROPHIC VULVOVAGINITIS: Status: ACTIVE | Noted: 2021-05-18

## 2024-06-16 PROBLEM — Z12.4 ENCOUNTER FOR SCREENING FOR CERVICAL CANCER: Status: ACTIVE | Noted: 2021-04-06

## 2024-06-16 PROBLEM — N83.209 OVARIAN CYST: Status: ACTIVE | Noted: 2022-06-22

## 2024-06-16 PROBLEM — Z92.3 HISTORY OF THERAPEUTIC RADIATION: Status: RESOLVED | Noted: 2024-06-16 | Resolved: 2024-06-16

## 2024-06-16 PROBLEM — R10.2 PELVIC PAIN: Status: ACTIVE | Noted: 2024-01-24

## 2024-06-16 PROBLEM — D21.9 FIBROID: Status: ACTIVE | Noted: 2024-03-11

## 2024-06-16 PROBLEM — N76.0 BACTERIAL VAGINOSIS: Status: ACTIVE | Noted: 2022-09-23

## 2024-06-16 NOTE — PHYSICAL EXAM
[Chaperone Declined] : Patient declined chaperone [Appropriately responsive] : appropriately responsive [Alert] : alert [No Acute Distress] : no acute distress [No Lymphadenopathy] : no lymphadenopathy [Regular Rate Rhythm] : regular rate rhythm [Soft] : soft [Non-tender] : non-tender [Non-distended] : non-distended [No Mass] : no mass [Oriented x3] : oriented x3 [Examination Of The Breasts] : a normal appearance [No Discharge] : no discharge [No Masses] : no breast masses were palpable [Vulvar Atrophy] : vulvar atrophy [No Lesions] : no lesions  [Labia Majora] : normal [Labia Minora] : normal [Normal] : normal [Atrophy] : atrophy [No Bleeding] : There was no active vaginal bleeding [Tenderness] : nontender [Enlarged ___ wks] : not enlarged [Mass ___ cm] : no uterine mass was palpated [Uterine Adnexae] : normal [FreeTextEntry4] : Minimal white/yellow discharge with mild odor [FreeTextEntry5] : Pap smear done [FreeTextEntry6] : On bimanual exam no masses palpated, no reproducible pain, no rebound or guarding

## 2024-06-16 NOTE — DISCUSSION/SUMMARY
[FreeTextEntry1] : A: 59-year-old for annual GYN exam, vulvovaginal atrophy, vaginal discharge-BV, intermittent pelvic pain, ovarian cyst, fibroid, BMI 27  P: GYN exam done Pap smear done MetroGel Rx given Safe sex practices if active Pain and bleeding precautions Recent imaging reviewed and discussed-referral for pelvic MRI given-will get authorization Follow-up with cardio and PCP as recommended Encourage yearly mammogram-will give referral as needed Encourage healthy diet and exercise Follow-up after tests and imaging resulted or as needed

## 2024-06-16 NOTE — HISTORY OF PRESENT ILLNESS
[Patient reported mammogram was normal] : Patient reported mammogram was normal [Patient reported PAP Smear was abnormal] : Patient reported PAP Smear was abnormal [Patient reported colonoscopy was normal] : Patient reported colonoscopy was normal [LMP unknown] : LMP unknown [postmenopausal] : postmenopausal [N] : Patient is not sexually active [Y] : Positive pregnancy history [unknown] : Patient is unsure of the date of her LMP [Menarche Age: ____] : age at menarche was [unfilled] [Currently In Menopause] : currently in menopause [Menopause Age: ____] : age at menopause was [unfilled] [Previously active] : previously active [Men] : men [No] : No [Gonorrhea test offered] : Gonorrhea test offered [Chlamydia test offered] : Chlamydia test offered [Trichomonas test offered] : Trichomonas test offered [HPV test offered] : HPV test offered [TextBox_4] : 59-year-old para 3-0-3-3 in menopause came for annual GYN exam and Pap smear. Patient denies postmenopausal bleeding, discharge or dysuria. Patient does complain of some intermittent pelvic pain and vaginal pressure and was sent for a pelvic ultrasound that she went 2 months ago and is now following up.  Patient counseled that that sonogram showed uterus with small nonsuspicious fibroids with endometrial stripe of 2 mm which is within normal limits.  Both ovaries were normal in size and the left ovary showed a 2.2 x 1.8 cm simple ovarian cyst.  There was no free fluid.  As per radiology recommendation follow-up was not necessary as most likely benign.  Patient continues to have intermittent symptoms and due to no significant findings we will get authorization to follow-up with an MRI.  Patient denies history of abnormal Pap but did have high risk HPV show upon most recent Pap smear.  She denies other STDs. She is not currently sexually active.  Since her last visit she has been following up with cardio for an abnormal EKG and palpitations with tachycardia-next appointment is next week.  Heart rate today 71.  She also has been being followed for a lesion in her neck and also has had radiation from the meningioma of her brain August 2023.  She has a 6-month follow-up.  She had also been taking phentermine and stopped and was put on Ozempic but has finished and she received headaches from the medication.  She has follow-up with her PCP. [Mammogramdate] : 4/2024 [TextBox_19] : BI-RADS 1 [PapSmeardate] : 12/2022 [TextBox_31] : High risk HPV, cells negative [ColonoscopyDate] : 2022 [TextBox_43] : f/u 2 yrs  [PGxTotal] : 6 [[facility]] : 3 [PGHxPremature] : 0 [PGHxAbortions] : 3 [[facility] Centeriving] : 3 [PGHxABInduced] : 2 [PGHxABSpont] : 1 [FreeTextEntry1] :

## 2024-06-20 ENCOUNTER — NON-APPOINTMENT (OUTPATIENT)
Age: 59
End: 2024-06-20

## 2025-04-21 ENCOUNTER — NON-APPOINTMENT (OUTPATIENT)
Age: 60
End: 2025-04-21

## 2025-06-16 ENCOUNTER — APPOINTMENT (OUTPATIENT)
Dept: OBGYN | Facility: CLINIC | Age: 60
End: 2025-06-16

## 2025-06-16 ENCOUNTER — NON-APPOINTMENT (OUTPATIENT)
Age: 60
End: 2025-06-16

## 2025-06-16 VITALS
HEIGHT: 67 IN | SYSTOLIC BLOOD PRESSURE: 116 MMHG | BODY MASS INDEX: 26.68 KG/M2 | DIASTOLIC BLOOD PRESSURE: 78 MMHG | HEART RATE: 78 BPM | TEMPERATURE: 98.6 F | WEIGHT: 170 LBS

## 2025-06-16 PROCEDURE — 99396 PREV VISIT EST AGE 40-64: CPT | Mod: 1L

## 2025-06-20 LAB — HPV HIGH+LOW RISK DNA PNL CVX: NOT DETECTED

## 2025-08-01 ENCOUNTER — APPOINTMENT (OUTPATIENT)
Dept: OBGYN | Facility: CLINIC | Age: 60
End: 2025-08-01